# Patient Record
Sex: FEMALE | Race: WHITE
[De-identification: names, ages, dates, MRNs, and addresses within clinical notes are randomized per-mention and may not be internally consistent; named-entity substitution may affect disease eponyms.]

---

## 2021-04-10 ENCOUNTER — HOSPITAL ENCOUNTER (EMERGENCY)
Dept: HOSPITAL 75 - ER | Age: 26
Discharge: HOME | End: 2021-04-10
Payer: MEDICAID

## 2021-04-10 VITALS — WEIGHT: 212.97 LBS | BODY MASS INDEX: 35.48 KG/M2 | HEIGHT: 64.96 IN

## 2021-04-10 VITALS — SYSTOLIC BLOOD PRESSURE: 136 MMHG | DIASTOLIC BLOOD PRESSURE: 98 MMHG
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
## 2021-04-10 DIAGNOSIS — R40.2140: ICD-10-CM

## 2021-04-10 DIAGNOSIS — R40.2250: ICD-10-CM

## 2021-04-10 DIAGNOSIS — Z88.2: ICD-10-CM

## 2021-04-10 DIAGNOSIS — R40.2360: ICD-10-CM

## 2021-04-10 DIAGNOSIS — S06.0X0A: Primary | ICD-10-CM

## 2021-04-10 LAB
AMORPH SED URNS QL MICRO: (no result) /LPF
APTT PPP: YELLOW S
BACTERIA #/AREA URNS HPF: NEGATIVE /HPF
BILIRUB UR QL STRIP: NEGATIVE
FIBRINOGEN PPP-MCNC: (no result) MG/DL
GLUCOSE UR STRIP-MCNC: NEGATIVE MG/DL
KETONES UR QL STRIP: NEGATIVE
LEUKOCYTE ESTERASE UR QL STRIP: (no result)
NITRITE UR QL STRIP: NEGATIVE
PH UR STRIP: 7.5 [PH] (ref 5–9)
PROT UR QL STRIP: NEGATIVE
RBC #/AREA URNS HPF: (no result) /HPF
SP GR UR STRIP: 1.02 (ref 1.02–1.02)

## 2021-04-10 PROCEDURE — 84703 CHORIONIC GONADOTROPIN ASSAY: CPT

## 2021-04-10 PROCEDURE — 70450 CT HEAD/BRAIN W/O DYE: CPT

## 2021-04-10 PROCEDURE — 72125 CT NECK SPINE W/O DYE: CPT

## 2021-04-10 PROCEDURE — 81000 URINALYSIS NONAUTO W/SCOPE: CPT
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
## 2021-06-23 ENCOUNTER — HOSPITAL ENCOUNTER (EMERGENCY)
Dept: HOSPITAL 75 - ER | Age: 26
LOS: 1 days | Discharge: HOME | End: 2021-06-24
Payer: MEDICAID

## 2021-06-23 VITALS — HEIGHT: 64.96 IN | WEIGHT: 212.53 LBS | BODY MASS INDEX: 35.41 KG/M2

## 2021-06-23 DIAGNOSIS — F17.210: ICD-10-CM

## 2021-06-23 DIAGNOSIS — L03.116: ICD-10-CM

## 2021-06-23 DIAGNOSIS — N39.0: ICD-10-CM

## 2021-06-23 DIAGNOSIS — M54.5: ICD-10-CM
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
## 2021-06-23 DIAGNOSIS — S93.602A: Primary | ICD-10-CM

## 2021-06-23 LAB
APTT PPP: YELLOW S
BILIRUB UR QL STRIP: NEGATIVE
FIBRINOGEN PPP-MCNC: (no result) MG/DL
GLUCOSE UR STRIP-MCNC: NEGATIVE MG/DL
KETONES UR QL STRIP: NEGATIVE
LEUKOCYTE ESTERASE UR QL STRIP: (no result)
NITRITE UR QL STRIP: NEGATIVE
PH UR STRIP: 7 [PH] (ref 5–9)
PROT UR QL STRIP: NEGATIVE
SP GR UR STRIP: 1.02 (ref 1.02–1.02)

## 2021-06-23 PROCEDURE — 84703 CHORIONIC GONADOTROPIN ASSAY: CPT

## 2021-06-23 PROCEDURE — 73630 X-RAY EXAM OF FOOT: CPT

## 2021-06-23 PROCEDURE — 81000 URINALYSIS NONAUTO W/SCOPE: CPT

## 2021-06-23 PROCEDURE — 87088 URINE BACTERIA CULTURE: CPT

## 2021-06-23 PROCEDURE — 72100 X-RAY EXAM L-S SPINE 2/3 VWS: CPT

## 2021-06-23 PROCEDURE — 80306 DRUG TEST PRSMV INSTRMNT: CPT

## 2021-06-24 VITALS — SYSTOLIC BLOOD PRESSURE: 127 MMHG | DIASTOLIC BLOOD PRESSURE: 75 MMHG

## 2021-06-24 LAB
BACTERIA #/AREA URNS HPF: (no result) /HPF
BARBITURATES UR QL: NEGATIVE
BENZODIAZ UR QL SCN: NEGATIVE
COCAINE UR QL: NEGATIVE
METHADONE UR QL SCN: NEGATIVE
METHAMPHETAMINE SCREEN URINE S: POSITIVE
OPIATES UR QL SCN: NEGATIVE
OXYCODONE UR QL: NEGATIVE
PROPOXYPH UR QL: NEGATIVE
RBC #/AREA URNS HPF: (no result) /HPF
TRICHOMONAS #/AREA URNS HPF: (no result) /HPF
TRICYCLICS UR QL SCN: NEGATIVE
WBC #/AREA URNS HPF: (no result) /HPF

## 2021-06-24 NOTE — ED GENERAL
General


Chief Complaint:  Lower Extremity


Stated Complaint:  LEFT FOOT & LOWER BACK PAIN


Nursing Triage Note:  


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 DIFFICULTY 


WALKING. C/O RIGHT LOWER BACK PAIN RADIATING TO RIGHT SIDE FROM DIFFICULTY 


WALKING.


Nursing Sepsis Screen:  No Definite Risk





Allergies and Home Medications


Allergies


Coded Allergies:  


     Sulfa (Sulfonamide Antibiotics) (Verified  Allergy, Unknown, 4/10/21)





Past Medical-Social-Family Hx


Patient Social History


Alcohol Use:  Denies Use


Drug of Choice:  Denies


Smoking Status:  Current Everyday Smoker


Type Used:  Cigarettes


2nd Hand Smoke Exposure:  Yes


Recent Infectious Disease Expo:  No


Recent Hopitalizations:  No





Immunizations Up To Date


Tetanus Booster (TDap):  Unknown





Seasonal Allergies


Seasonal Allergies:  No





Past Medical History


Surgeries:  Yes (D &C, )


Adenoidectomy, Appendectomy


Respiratory:  No


Cardiac:  No


Neurological:  No


Pregnant:  No


Last Menstrual Period:  Jun 5, 2021


Sexually Transmitted Disease:  Yes


Genitourinary:  No


Gastrointestinal:  No


Musculoskeletal:  No


Endocrine:  No


HEENT:  No


Cancer:  No


Psychosocial:  Yes


PTSD, Depression


Integumentary:  Yes


Herpes





Physical Exam


Vital Signs





Vital Signs - First Documented








 6/23/21





 23:24


 


Temp 36.9


 


Pulse 114


 


Resp 20


 


B/P (MAP) 135/73 (93)


 


Pulse Ox 98


 


O2 Delivery Room Air





Capillary Refill : Less Than 3 Seconds


Height, Weight, BMI


Height: '"


Weight: lbs. oz. kg; 35.00 BMI


Method:





Progress/Results/Core Measures


Suspected Sepsis


Recent Fever Within 48 Hours:  No


Infection Criteria Present:  None


New/Unexplained  Altered Menta:  No


Sepsis Screen:  No Definite Risk


SIRS


Temperature: 


Pulse: 114 


Respiratory Rate: 20


 


Blood Pressure 135 /73 


Mean: 93





Results/Orders


Lab Results





Laboratory Tests








Test


 6/23/21


23:48 Range/Units


 


 


Urine Color YELLOW   


 


Urine Clarity CLOUDY   


 


Urine pH 7.0  5-9  


 


Urine Specific Gravity 1.020  1.016-1.022  


 


Urine Protein NEGATIVE  NEGATIVE  


 


Urine Glucose (UA) NEGATIVE  NEGATIVE  


 


Urine Ketones NEGATIVE  NEGATIVE  


 


Urine Nitrite NEGATIVE  NEGATIVE  


 


Urine Bilirubin NEGATIVE  NEGATIVE  


 


Urine Urobilinogen 0.2  < = 1.0  MG/DL


 


Urine Leukocyte Esterase 3+ H NEGATIVE  


 


Urine RBC (Auto) NEGATIVE  NEGATIVE  


 


Urine RBC NONE   /HPF


 


Urine WBC 25-50 H  /HPF


 


Urine Crystals NONE   /LPF


 


Urine Bacteria FEW H  /HPF


 


Urine Casts NONE   /LPF


 


Urine Mucus NEGATIVE   /LPF


 


Urine Trichomonas MODERATE H  /HPF


 


Urine Culture Indicated YES   


 


Urine Opiates Screen NEGATIVE  NEGATIVE  


 


Urine Oxycodone Screen NEGATIVE  NEGATIVE  


 


Urine Methadone Screen NEGATIVE  NEGATIVE  


 


Urine Propoxyphene Screen NEGATIVE  NEGATIVE  


 


Urine Barbiturates Screen NEGATIVE  NEGATIVE  


 


Ur Tricyclic Antidepressants


Screen NEGATIVE 


 NEGATIVE  





 


Urine Phencyclidine Screen NEGATIVE  NEGATIVE  


 


Urine Amphetamines Screen POSITIVE H NEGATIVE  


 


Urine Methamphetamines Screen POSITIVE H NEGATIVE  


 


Urine Benzodiazepines Screen NEGATIVE  NEGATIVE  


 


Urine Cocaine Screen NEGATIVE  NEGATIVE  


 


Urine Cannabinoids Screen NEGATIVE  NEGATIVE  








My Orders





Orders - JOAQUIN JANG DO


Urine Pregnancy Bedside (6/23/21 23:46)


Drug Screen Stat (Urine) (6/23/21 23:46)


Ua Culture If Indicated (6/23/21 23:46)


Foot, Left, 3 Views (6/24/21 00:01)


Lumbar Spine - 2-3 Views (6/24/21 00:01)


Urine Culture (6/23/21 23:48)


Rx-Cyclobenzaprine Tablet (Rx-Flexeril T (6/24/21 01:00)


Rx-Naproxen (Rx-Naprosyn) (6/24/21 01:00)


Cefdinir Capsule (Omnicef Capsule) (6/24/21 01:00)





Vital Signs/I&O











 6/23/21





 23:24


 


Temp 36.9


 


Pulse 114


 


Resp 20


 


B/P (MAP) 135/73 (93)


 


Pulse Ox 98


 


O2 Delivery Room Air





Capillary Refill : Less Than 3 Seconds








Blood Pressure Mean:                    93











Departure


Impression





   Primary Impression:  


   Sprain of left foot


   Additional Impressions:  


   Cellulitis of left foot


   UTI (urinary tract infection)


   Low back pain


Disposition:  01 HOME, SELF-CARE


Condition:  Stable





Departure-Patient Inst.


Decision time for Depature:  00:55


Referrals:  


[facility] (PCP/Family)


Primary Care Physician


Patient Instructions:  Urinary Tract Infection, Adult ED, Foot Sprain ED, 

Cellulitis (Skin Infection), Adult ED, Low Back Pain ED





Add. Discharge Instructions:  


SOAK FOOT IN WARM SOAPY WATER 2-3 TIMES A DAY, APPLY ANTIBIOTIC OINTMENT AND 

FRESH DRESSING 2-3 TIMES A DAY





WEAR POST OP SHOE AS NEEDED FOR COMFORT





LOTS OF CLEAR LIQUIDS--NO COFFEE, POP OR TEA





TYLENOL 1 GRAM 4 TIMES A DAY AS NEEDED FOR PAIN 





NO DRUGS





FOLLOW UP WITH [facility] IN 2-3 DAYS FOR FURTHER CARE--CALL IN AM TO SCHEDULE 

APPOINTMENT





All discharge instructions reviewed with patient and/or family. Voiced 

understanding.


Scripts


Mupirocin (Mupirocin) 22 Gm Oint...g.


22 GM TP BID, #1 TUBE


   Prov: JOAQUIN JANG DO         6/24/21 


Naproxen (Naproxen) 500 Mg Tablet.dr


500 MG PO BID, #20 TAB


   Prov: JOAQUIN JANG DO         6/24/21 


Cyclobenzaprine HCl (Cyclobenzaprine HCl) 10 Mg Tablet


10 MG PO Q8H PRN for SPASMS, #15 TAB 0 Refills


   Prov: JOAQUIN JANG DO         6/24/21 


Cefdinir (Cefdinir) 300 Mg Capsule


300 MG PO BID, #20 CAP


   Prov: JOAQUIN JANG DO         6/24/21











JOAQUIN JANG DO                 Jun 24, 2021 01:04

## 2021-06-24 NOTE — DIAGNOSTIC IMAGING REPORT
EXAMINATION: Lumbar spine, 3 views



INDICATION: Low back pain.



COMPARISON: None available.



FINDINGS:

Normal variant transitional anatomy suspected, with lumbarization

of the S1 vertebral body. No fracture or acute osseous

abnormality. Vertebral body heights are maintained. Spinal

alignment and intervertebral disc spaces are preserved. There is

moderate retained stool in the ascending colon. Surgical clip is

demonstrated in the right lower quadrant.



IMPRESSION:

No acute fracture or subluxation involving the lumbar spine.



Dictated by: 



  Dictated on workstation # [id number]

## 2021-06-24 NOTE — DIAGNOSTIC IMAGING REPORT
Clinical indication: Patient hit her foot on a grocery cart 2

days ago. Patient complains of pain that is making her limp,

which is causing low back pain.



EXAM: X-ray of the left foot, 3 views.



COMPARISON: None.



FINDINGS:

There is no acute fracture or dislocation. There is no

significant bone or joint abnormality.



IMPRESSION:

There is no acute fracture or dislocation.



Dictated by: 



  Dictated on workstation # [id number]